# Patient Record
Sex: MALE | NOT HISPANIC OR LATINO | Employment: UNEMPLOYED | URBAN - METROPOLITAN AREA
[De-identification: names, ages, dates, MRNs, and addresses within clinical notes are randomized per-mention and may not be internally consistent; named-entity substitution may affect disease eponyms.]

---

## 2018-04-19 ENCOUNTER — HOSPITAL ENCOUNTER (EMERGENCY)
Facility: HOSPITAL | Age: 28
Discharge: HOME | End: 2018-04-19
Attending: EMERGENCY MEDICINE
Payer: SUBSIDIZED

## 2018-04-19 VITALS
DIASTOLIC BLOOD PRESSURE: 83 MMHG | HEART RATE: 62 BPM | RESPIRATION RATE: 16 BRPM | SYSTOLIC BLOOD PRESSURE: 121 MMHG | OXYGEN SATURATION: 100 % | TEMPERATURE: 96.7 F | WEIGHT: 169.25 LBS

## 2018-04-19 DIAGNOSIS — Z23 NEED FOR RABIES VACCINATION: Primary | ICD-10-CM

## 2018-04-19 PROCEDURE — 90375 RABIES IG IM/SC: CPT | Performed by: PHYSICIAN ASSISTANT

## 2018-04-19 PROCEDURE — 90471 IMMUNIZATION ADMIN: CPT

## 2018-04-19 PROCEDURE — 90675 RABIES VACCINE IM: CPT | Performed by: PHYSICIAN ASSISTANT

## 2018-04-19 PROCEDURE — 99283 EMERGENCY DEPT VISIT LOW MDM: CPT

## 2018-04-19 PROCEDURE — 96372 THER/PROPH/DIAG INJ SC/IM: CPT

## 2018-04-19 RX ADMIN — RABIES VACCINE 1 ML: KIT at 16:50

## 2018-04-19 RX ADMIN — RABIES IMMUNE GLOBULIN (HUMAN) 1530 UNITS: 150 INJECTION INTRAMUSCULAR at 16:53

## 2018-04-19 NOTE — DISCHARGE INSTRUCTIONS
Rabies   WHAT YOU NEED TO KNOW:   Rabies is a disease that affects the body's central nervous system (brain, spinal cord, and nerves)  Rabies is caused by a virus  You may get the virus if you come into contact with the saliva or other tissue of an infected animal  Rabies infection usually happens through a bite wound  Animals that may spread rabies include dogs, cats, coyotes, raccoons, foxes, skunks, and bats  Rabies develops when the virus enters the skin and goes to the muscles or nerves  DISCHARGE INSTRUCTIONS:   Call 911 for any of the following:   · You have trouble swallowing or slurred speech  · You have double vision, or you see things that are not really there  · You begin twitching, have muscle cramps, or have a seizure  Seek care immediately if:   · You think you were exposed to rabies  · You were bitten by an animal      · You feel weak, tired, dizzy, confused, restless, or anxious  Contact your healthcare provider if:   · You have a fever  · Your signs and symptoms do not get better after treatment  · You have questions or concerns about rabies or rabies treatment  Medicines:   · Medicines  such as the rabies vaccine or immune globulin may be given  These medicines help your body fight the virus and prevent rabies  · Take your medicine as directed  Contact your healthcare provider if you think your medicine is not helping or if you have side effects  Tell him or her if you are allergic to any medicine  Keep a list of the medicines, vitamins, and herbs you take  Include the amounts, and when and why you take them  Bring the list or the pill bottles to follow-up visits  Carry your medicine list with you in case of an emergency  Follow up with your healthcare provider as directed:  Write down your questions so you remember to ask them during your visits  Prevent rabies:   · Ask your healthcare provider about the rabies vaccine    You may need the vaccine if your work puts you at risk for rabies  You may also need the vaccine if you plan to travel to places where the risk for rabies is high  Ask for more information on rabies shots  · Avoid contact with animals  Do not approach any tame or wild animal that you do not know  Do not try to take them home with you  Cover windows and other openings in your home with screens so wild animals cannot get inside  · Get medical care if you get bitten by an animal   Do this even if the wound is very small  · Get your pet vaccinated against rabies  You will need to do this every 3 years or as directed by your   What to do if an animal bites you:  Clean the bite wound well and cover the wound with a clean bandage  Then contact your healthcare provider  © 2017 [employer] Information is for End User's use only and may not be sold, redistributed or otherwise used for commercial purposes  All illustrations and images included in CareNotes® are the copyrighted property of A D A M [employer]  or [employer]land  The above information is an  only  It is not intended as medical advice for individual conditions or treatments  Talk to your doctor, nurse or pharmacist before following any medical regimen to see if it is safe and effective for you

## 2018-04-19 NOTE — ED NOTES
Assisted Neeta Dougherty RN with rabies vaccinations  Explained return dates        Lacy Hennessy RN  04/19/18 4385

## 2018-04-19 NOTE — ED PROVIDER NOTES
History  Chief Complaint   Patient presents with   [address] [place] [address]     Was exposed to own dog's saliva after being bitten by a raccoon last night     29 y/o male presenting for rabies vaccination  Relays his dog got into a fight with a racoon and he was trying to break up the fight  This occurred yesterday  Dog sustained laceration and patient came in contact with the racoons saliva and he has some open abrasions on the hands  Did not get bitten or scratched  Was sent from vet for vaccines  Currently asymptomatic  None       History reviewed  No pertinent past medical history  History reviewed  No pertinent surgical history  History reviewed  No pertinent family history  I have reviewed and agree with the history as documented  Social History   Substance Use Topics    Smoking status: Current Every Day Smoker     Packs/day: 0 25    Smokeless tobacco: Never Used    Alcohol use Yes      Comment: occassional        Review of Systems   Constitutional: Negative  HENT: Negative  Respiratory: Negative  Cardiovascular: Negative  Gastrointestinal: Negative  Genitourinary: Negative  Musculoskeletal: Negative  Skin: Positive for wound  Negative for color change, pallor and rash  Neurological: Negative  All other systems reviewed and are negative  Physical Exam  ED Triage Vitals [04/19/18 1522]   Temperature Pulse Respirations Blood Pressure SpO2   (!) 96 7 °F (35 9 °C) 62 16 121/83 100 %      Temp Source Heart Rate Source Patient Position - Orthostatic VS BP Location FiO2 (%)   Tympanic Monitor Sitting Right arm --      Pain Score       No Pain           Orthostatic Vital Signs  Vitals:    04/19/18 1522   BP: 121/83   Pulse: 62   Patient Position - Orthostatic VS: Sitting       Physical Exam   Constitutional: He is oriented to person, place, and time  He appears well-developed and well-nourished  HENT:   Head: Normocephalic and atraumatic     Eyes: Conjunctivae and EOM are normal  Pupils are equal, round, and reactive to light  Neck: Normal range of motion  Neck supple  Cardiovascular: Normal rate  Pulmonary/Chest: Effort normal    spo2 is 100% indicating adequate oxygenation  Neurological: He is alert and oriented to person, place, and time  Skin: Skin is warm and dry  Capillary refill takes less than 2 seconds  Psychiatric: He has a normal mood and affect  His behavior is normal  Judgment and thought content normal    Nursing note and vitals reviewed  ED Medications  Medications   rabies immune globulin, human (IMOGAM RABIES-HT) IM injection 1,530 Units (1,530 Units Infiltration Given 4/19/18 1653)   rabies vaccine, chick embryo (RABAVERT) IM injection 1 mL (1 mL Intramuscular Given 4/19/18 1650)       Diagnostic Studies  Results Reviewed     None                 No orders to display              Procedures  Procedures       Phone Contacts  ED Phone Contact    ED Course  ED Course                                MDM  Number of Diagnoses or Management Options  Need for rabies vaccination:   Diagnosis management comments: Patient is informed to return to the emergency department for worsening of symptoms and was given proper education regarding their diagnosis and symptoms  Otherwise the patient is informed to follow up with their primary care doctor for re-evaluation  The patient verbalizes understanding and agrees with above assessment and plan  All questions were answered  Please Note: Fluency Direct voice recognition software may have been used in the creation of this document  Wrong words or sound a like substitutions may have occurred due to the inherent limitations of the voice software             Amount and/or Complexity of Data Reviewed  Review and summarize past medical records: yes  Independent visualization of images, tracings, or specimens: yes      CritCare Time    Disposition  Final diagnoses:   Need for rabies vaccination     Time reflects when diagnosis was documented in both MDM as applicable and the Disposition within this note     Time User Action Codes Description Comment    4/19/2018  5:11 PM Tariq 176, Butch 60 [Z23] Need for rabies vaccination       ED Disposition     ED Disposition Condition Comment    Discharge  Bushra Laboy discharge to home/self care  Condition at discharge: Good        Follow-up Information     Follow up With Specialties Details Why Contact Info Additional Information    [address] [facility] Emergency Department Emergency Medicine   [address] [facility] [address]  [phone] [facility], [place] [address] Urgent Care Schedule an appointment as soon as possible for a visit on 4/22, 4/26, 5/3 for your next 3 rabies vaccinations  [facility]  [address] [place] [address], [facility]        Patient's Medications    No medications on file     No discharge procedures on file      ED Provider  Electronically Signed by           Meeta Reece PA-C  04/19/18 5870

## 2018-04-22 ENCOUNTER — HOSPITAL ENCOUNTER (EMERGENCY)
Facility: HOSPITAL | Age: 28
Discharge: HOME | End: 2018-04-22
Attending: EMERGENCY MEDICINE | Admitting: EMERGENCY MEDICINE
Payer: SUBSIDIZED

## 2018-04-22 VITALS
BODY MASS INDEX: 25.18 KG/M2 | HEIGHT: 69 IN | SYSTOLIC BLOOD PRESSURE: 131 MMHG | DIASTOLIC BLOOD PRESSURE: 66 MMHG | WEIGHT: 170 LBS | RESPIRATION RATE: 18 BRPM | OXYGEN SATURATION: 96 % | HEART RATE: 68 BPM | TEMPERATURE: 97.8 F

## 2018-04-22 DIAGNOSIS — Z20.3 RABIES CONTACT: Primary | ICD-10-CM

## 2018-04-22 PROCEDURE — 90471 IMMUNIZATION ADMIN: CPT

## 2018-04-22 PROCEDURE — 99281 EMR DPT VST MAYX REQ PHY/QHP: CPT

## 2018-04-22 PROCEDURE — 90675 RABIES VACCINE IM: CPT | Performed by: EMERGENCY MEDICINE

## 2018-04-22 RX ADMIN — RABIES VACCINE 1 ML: KIT at 18:47

## 2018-04-22 NOTE — DISCHARGE INSTRUCTIONS
Rabies Vaccine (By injection)   Rabies Vaccine (RAY-beez VAX-een)  Prevents infection caused by rabies virus  The vaccine can be given before or after you are exposed to the rabies virus  Brand Name(s): Imovax Rabies, RabAvert   There may be other brand names for this medicine  When This Medicine Should Not Be Used: You should not receive a rabies vaccine if you have had an allergic reaction to it before  How to Use This Medicine:   Injectable  · Your doctor will prescribe your exact dose and tell you how often it should be given  This medicine is given as a shot into one of your muscles  The vaccine is injected into the upper arm muscle  Very young or small children may have the vaccine injected into the upper leg muscle  · A nurse or other health provider will give you this medicine  · You are at risk for exposure to the rabies virus if you work with animals or will be going to a country where rabies is common  People who are at risk of being exposed to rabies will receive 3 doses on 3 different days within a 1-month period  · If you have received the vaccine in the past and have been exposed to the rabies virus, you will need to receive 2 doses on 2 different days within a 1-month period  · If you have not yet received the vaccine and were exposed to the rabies virus, you will need a total of 5 doses on 5 different days within a 1-month period  You will also receive a shot of rabies immune globulin  · It is very important that you have the shots on the exact day your doctor tells you to  If a dose is missed:   · You must use this medicine on a fixed schedule  Call your doctor or pharmacist if you miss a dose  Drugs and Foods to Avoid:   Ask your doctor or pharmacist before using any other medicine, including over-the-counter medicines, vitamins, and herbal products    · Tell your doctor before you receive this vaccine if you take medicine that weakens your immune system, such as a steroid (such as dexamethasone, hydrocortisone, methylprednisolone, prednisolone, prednisone, Medrol®) or cancer treatment  Warnings While Using This Medicine:   · Make sure your doctor knows if you are pregnant or breastfeeding  Tell your doctor if you have had an allergic reaction to any type of vaccine, if you have an illness with fever, or if you have an immune system problem  · This medicine is made from donated human blood  Some human blood products have transmitted viruses to people who have received them, although the risk is low  Human donors and donated blood are both tested for viruses to keep the transmission risk low  Talk with your médico about this risk if you are concerned  · Your doctor will do lab tests at regular visits to check on the effects of this medicine  Keep all appointments  Possible Side Effects While Using This Medicine:   Call your doctor right away if you notice any of these side effects:  · Allergic reaction: Itching or hives, swelling in your face or hands, swelling or tingling in your mouth or throat, chest tightness, trouble breathing  · Muscle pain, stiffness, or weakness  · Numbness, tingling, or burning pain in your hands, arms, legs, or feet  If you notice these less serious side effects, talk with your doctor:   · Dizziness, headache  · Fever  · Itching, pain, redness, or swelling where the shot was given  · Nausea, stomach pain  · Tiredness  If you notice other side effects that you think are caused by this medicine, tell your doctor  Call your doctor for medical advice about side effects  You may report side effects to FDA at 7-266-FDA-1298  © 2017 [employer] Information is for End User's use only and may not be sold, redistributed or otherwise used for commercial purposes  The above information is an  only  It is not intended as medical advice for individual conditions or treatments   Talk to your doctor, nurse or pharmacist before following any medical regimen to see if it is safe and effective for you

## 2018-04-26 ENCOUNTER — HOSPITAL ENCOUNTER (EMERGENCY)
Facility: HOSPITAL | Age: 28
Discharge: HOME | End: 2018-04-26
Attending: EMERGENCY MEDICINE | Admitting: EMERGENCY MEDICINE
Payer: SUBSIDIZED

## 2018-04-26 VITALS
SYSTOLIC BLOOD PRESSURE: 134 MMHG | RESPIRATION RATE: 16 BRPM | TEMPERATURE: 98 F | OXYGEN SATURATION: 99 % | HEART RATE: 62 BPM | DIASTOLIC BLOOD PRESSURE: 71 MMHG

## 2018-04-26 DIAGNOSIS — Z20.3 NEED FOR POST EXPOSURE PROPHYLAXIS FOR RABIES: Primary | ICD-10-CM

## 2018-04-26 PROCEDURE — 99281 EMR DPT VST MAYX REQ PHY/QHP: CPT

## 2018-04-26 PROCEDURE — 90471 IMMUNIZATION ADMIN: CPT

## 2018-04-26 PROCEDURE — 90675 RABIES VACCINE IM: CPT | Performed by: EMERGENCY MEDICINE

## 2018-04-26 RX ADMIN — RABIES VACCINE 1 ML: KIT at 18:54

## 2018-04-26 NOTE — DISCHARGE INSTRUCTIONS
Rabies Vaccine (By injection)   Rabies Vaccine (RAY-beez VAX-een)  Prevents infection caused by rabies virus  The vaccine can be given before or after you are exposed to the rabies virus  Brand Name(s): Imovax Rabies, RabAvert   There may be other brand names for this medicine  When This Medicine Should Not Be Used: You should not receive a rabies vaccine if you have had an allergic reaction to it before  How to Use This Medicine:   Injectable  · Your doctor will prescribe your exact dose and tell you how often it should be given  This medicine is given as a shot into one of your muscles  The vaccine is injected into the upper arm muscle  Very young or small children may have the vaccine injected into the upper leg muscle  · A nurse or other health provider will give you this medicine  · You are at risk for exposure to the rabies virus if you work with animals or will be going to a country where rabies is common  People who are at risk of being exposed to rabies will receive 3 doses on 3 different days within a 1-month period  · If you have received the vaccine in the past and have been exposed to the rabies virus, you will need to receive 2 doses on 2 different days within a 1-month period  · If you have not yet received the vaccine and were exposed to the rabies virus, you will need a total of 5 doses on 5 different days within a 1-month period  You will also receive a shot of rabies immune globulin  · It is very important that you have the shots on the exact day your doctor tells you to  If a dose is missed:   · You must use this medicine on a fixed schedule  Call your doctor or pharmacist if you miss a dose  Drugs and Foods to Avoid:   Ask your doctor or pharmacist before using any other medicine, including over-the-counter medicines, vitamins, and herbal products    · Tell your doctor before you receive this vaccine if you take medicine that weakens your immune system, such as a steroid (such as dexamethasone, hydrocortisone, methylprednisolone, prednisolone, prednisone, Medrol®) or cancer treatment  Warnings While Using This Medicine:   · Make sure your doctor knows if you are pregnant or breastfeeding  Tell your doctor if you have had an allergic reaction to any type of vaccine, if you have an illness with fever, or if you have an immune system problem  · This medicine is made from donated human blood  Some human blood products have transmitted viruses to people who have received them, although the risk is low  Human donors and donated blood are both tested for viruses to keep the transmission risk low  Talk with your médico about this risk if you are concerned  · Your doctor will do lab tests at regular visits to check on the effects of this medicine  Keep all appointments  Possible Side Effects While Using This Medicine:   Call your doctor right away if you notice any of these side effects:  · Allergic reaction: Itching or hives, swelling in your face or hands, swelling or tingling in your mouth or throat, chest tightness, trouble breathing  · Muscle pain, stiffness, or weakness  · Numbness, tingling, or burning pain in your hands, arms, legs, or feet  If you notice these less serious side effects, talk with your doctor:   · Dizziness, headache  · Fever  · Itching, pain, redness, or swelling where the shot was given  · Nausea, stomach pain  · Tiredness  If you notice other side effects that you think are caused by this medicine, tell your doctor  Call your doctor for medical advice about side effects  You may report side effects to FDA at 8-016-FDA-6385  © 2017 [employer] Information is for End User's use only and may not be sold, redistributed or otherwise used for commercial purposes  The above information is an  only  It is not intended as medical advice for individual conditions or treatments   Talk to your doctor, nurse or pharmacist before following any medical regimen to see if it is safe and effective for you

## 2018-04-27 NOTE — ED PROVIDER NOTES
History  Chief Complaint   Patient presents with    Follow Up Rabies     third visit for rabies vaccine  states his last vaccine was in the right deltoid and he has been having muscle twitching and pain since the injection     Pt presented to the ED for f/u 3rd rabies shot  No acute medical/traumatic or tox issues at this time  History provided by:  Patient   used: No        None       History reviewed  No pertinent past medical history  History reviewed  No pertinent surgical history  History reviewed  No pertinent family history  I have reviewed and agree with the history as documented  Social History   Substance Use Topics    Smoking status: Current Every Day Smoker     Packs/day: 0 25    Smokeless tobacco: Never Used    Alcohol use Yes      Comment: occassional        Review of Systems   Constitutional: Negative for fever  HENT: Negative for congestion and sore throat  Eyes: Negative for visual disturbance  Respiratory: Negative for chest tightness and shortness of breath  Cardiovascular: Negative for chest pain  Gastrointestinal: Negative for abdominal pain  Genitourinary: Negative for difficulty urinating  Musculoskeletal: Negative for back pain, neck pain and neck stiffness  Rt deltoid spasm after 2nd rabies shot, intermittent, lasting hours, now resolved; no other muscle spasm/tremors   Skin: Negative for rash and wound  Allergic/Immunologic: Negative for immunocompromised state  Neurological: Negative for dizziness, weakness and headaches  Psychiatric/Behavioral: Negative for confusion  The patient is not nervous/anxious          Physical Exam  ED Triage Vitals [04/26/18 1834]   Temperature Pulse Respirations Blood Pressure SpO2   98 °F (36 7 °C) 62 16 134/71 99 %      Temp src Heart Rate Source Patient Position - Orthostatic VS BP Location FiO2 (%)   -- -- -- -- --      Pain Score       3           Orthostatic Vital Signs  Vitals: 04/26/18 1834   BP: 134/71   Pulse: 62       Physical Exam   Constitutional: He is oriented to person, place, and time  He appears well-developed and well-nourished  No distress  HENT:   Head: Normocephalic and atraumatic  Mouth/Throat: Oropharynx is clear and moist    Neck: Normal range of motion  Neck supple  Cardiovascular: Normal rate, regular rhythm and intact distal pulses  Pulmonary/Chest: Effort normal and breath sounds normal    Abdominal: Soft  He exhibits no distension  There is no tenderness  Musculoskeletal: Normal range of motion  He exhibits no tenderness  No muscle spasms noted   Neurological: He is alert and oriented to person, place, and time  Skin: Skin is warm and dry  He is not diaphoretic  Psychiatric: He has a normal mood and affect  His behavior is normal    Nursing note and vitals reviewed        ED Medications  Medications   rabies vaccine, chick embryo (RABAVERT) IM injection 1 mL (1 mL Intramuscular Given 4/26/18 1854)       Diagnostic Studies  Results Reviewed     None                 No orders to display              Procedures  Procedures       Phone Contacts  ED Phone Contact    ED Course                               MDM  Number of Diagnoses or Management Options  Need for post exposure prophylaxis for rabies: minor  Diagnosis management comments: Rabies vac       Amount and/or Complexity of Data Reviewed  Decide to obtain previous medical records or to obtain history from someone other than the patient: yes  Review and summarize past medical records: yes  Independent visualization of images, tracings, or specimens: yes    Risk of Complications, Morbidity, and/or Mortality  Presenting problems: minimal  Diagnostic procedures: minimal  Management options: minimal    Patient Progress  Patient progress: stable    CritCare Time    Disposition  Final diagnoses:   Need for post exposure prophylaxis for rabies     Time reflects when diagnosis was documented in both MDM as applicable and the Disposition within this note     Time User Action Codes Description Comment    4/26/2018  7:01 PM Kate Gonzales Add [Z20 3] Need for post exposure prophylaxis for rabies       ED Disposition     ED Disposition Condition Comment    Discharge  Nakia Sanchez discharge to home/self care  Condition at discharge: Good        Follow-up Information     Follow up With Specialties Details Why Contact Info Additional Information    [employer]  Schedule an appointment as soon as possible for a visit  [address] [place] [address] Emergency Department Emergency Medicine On 5/3/2018  [address] [facility]  [phone] [facility], [place], [place], [address]        There are no discharge medications for this patient  No discharge procedures on file      ED Provider  Electronically Signed by           Miguel Pate MD  04/27/18 0597

## 2018-05-03 ENCOUNTER — HOSPITAL ENCOUNTER (EMERGENCY)
Facility: HOSPITAL | Age: 28
Discharge: HOME | End: 2018-05-03
Attending: EMERGENCY MEDICINE | Admitting: EMERGENCY MEDICINE
Payer: SUBSIDIZED

## 2018-05-03 VITALS
WEIGHT: 170 LBS | DIASTOLIC BLOOD PRESSURE: 68 MMHG | TEMPERATURE: 97.6 F | RESPIRATION RATE: 16 BRPM | BODY MASS INDEX: 25.1 KG/M2 | HEART RATE: 77 BPM | OXYGEN SATURATION: 96 % | SYSTOLIC BLOOD PRESSURE: 121 MMHG

## 2018-05-03 DIAGNOSIS — Z23 NEED FOR RABIES VACCINATION: Primary | ICD-10-CM

## 2018-05-03 PROCEDURE — 99281 EMR DPT VST MAYX REQ PHY/QHP: CPT

## 2018-05-03 PROCEDURE — 90471 IMMUNIZATION ADMIN: CPT

## 2018-05-03 PROCEDURE — 90675 RABIES VACCINE IM: CPT | Performed by: PHYSICIAN ASSISTANT

## 2018-05-03 RX ADMIN — RABIES VACCINE 1 ML: KIT at 16:41

## 2018-05-03 NOTE — ED PROVIDER NOTES
History  Chief Complaint   Patient presents with    Follow Up Rabies     Pt here for last rabies vaccine  27 y/o male here for last rabies vaccination  No complaints at this time, feeling well  Exposed to saliva of rabid raccoon  None       History reviewed  No pertinent past medical history  History reviewed  No pertinent surgical history  History reviewed  No pertinent family history  I have reviewed and agree with the history as documented  Social History   Substance Use Topics    Smoking status: Current Every Day Smoker     Packs/day: 0 25    Smokeless tobacco: Never Used    Alcohol use Yes      Comment: occassional        Review of Systems   Constitutional: Negative  HENT: Negative  Eyes: Negative  Respiratory: Negative  Cardiovascular: Negative  Gastrointestinal: Negative  Genitourinary: Negative  Musculoskeletal: Negative  Skin: Negative  Neurological: Negative  All other systems reviewed and are negative  Physical Exam  ED Triage Vitals [05/03/18 1619]   Temperature Pulse Respirations Blood Pressure SpO2   97 6 °F (36 4 °C) 77 16 121/68 96 %      Temp Source Heart Rate Source Patient Position - Orthostatic VS BP Location FiO2 (%)   Tympanic Monitor Sitting Right arm --      Pain Score       No Pain           Orthostatic Vital Signs  Vitals:    05/03/18 1619   BP: 121/68   Pulse: 77   Patient Position - Orthostatic VS: Sitting       Physical Exam   Constitutional: He is oriented to person, place, and time  He appears well-developed and well-nourished  HENT:   Head: Normocephalic and atraumatic  Eyes: Conjunctivae are normal    Cardiovascular: Normal rate  Pulmonary/Chest: Effort normal    spo2 is 96% indicating adequate oxygenation  Neurological: He is alert and oriented to person, place, and time  Skin: Skin is warm and dry  Capillary refill takes less than 2 seconds  Nursing note and vitals reviewed        ED Medications  Medications   rabies vaccine, chick embryo (RABAVERT) IM injection 1 mL (not administered)       Diagnostic Studies  Results Reviewed     None                 No orders to display              Procedures  Procedures       Phone Contacts  ED Phone Contact    ED Course                               MDM  Number of Diagnoses or Management Options  Diagnosis management comments: Patient is informed to return to the emergency department for worsening of symptoms and was given proper education regarding their diagnosis and symptoms  Otherwise the patient is informed to follow up with their primary care doctor for re-evaluation  The patient verbalizes understanding and agrees with above assessment and plan  All questions were answered  Please Note: Fluency Direct voice recognition software may have been used in the creation of this document  Wrong words or sound a like substitutions may have occurred due to the inherent limitations of the voice software  Amount and/or Complexity of Data Reviewed  Review and summarize past medical records: yes  Independent visualization of images, tracings, or specimens: yes      CritCare Time    Disposition  Final diagnoses:   Need for rabies vaccination     Time reflects when diagnosis was documented in both MDM as applicable and the Disposition within this note     Time User Action Codes Description Comment    5/3/2018  4:40 PM Armin Villalta Add [Z23] Need for rabies vaccination       ED Disposition     ED Disposition Condition Comment    Discharge  [facility] discharge to home/self care      Condition at discharge: Good        Follow-up Information     Follow up With Specialties Details Why Contact Info Additional [address] Emergency Department Emergency Medicine Go to If symptoms worsen [address] [facility]  [phone] [facility] [id number]        Patient's Medications    No medications on file     No discharge procedures on file      ED Provider  Electronically Signed by           Rupesh Batista PA-C  05/03/18 0154